# Patient Record
Sex: FEMALE | Race: BLACK OR AFRICAN AMERICAN | NOT HISPANIC OR LATINO | URBAN - METROPOLITAN AREA
[De-identification: names, ages, dates, MRNs, and addresses within clinical notes are randomized per-mention and may not be internally consistent; named-entity substitution may affect disease eponyms.]

---

## 2017-05-22 ENCOUNTER — EMERGENCY (EMERGENCY)
Facility: HOSPITAL | Age: 27
LOS: 1 days | Discharge: HOME | End: 2017-05-22
Attending: EMERGENCY MEDICINE | Admitting: EMERGENCY MEDICINE
Payer: COMMERCIAL

## 2017-05-22 VITALS
DIASTOLIC BLOOD PRESSURE: 78 MMHG | TEMPERATURE: 98 F | RESPIRATION RATE: 18 BRPM | OXYGEN SATURATION: 98 % | SYSTOLIC BLOOD PRESSURE: 125 MMHG | HEART RATE: 70 BPM

## 2017-05-22 PROCEDURE — 99282 EMERGENCY DEPT VISIT SF MDM: CPT

## 2017-05-22 RX ORDER — METOCLOPRAMIDE HCL 10 MG
10 TABLET ORAL ONCE
Qty: 0 | Refills: 0 | Status: COMPLETED | OUTPATIENT
Start: 2017-05-22 | End: 2017-05-22

## 2017-05-22 NOTE — ED PROVIDER NOTE - PROGRESS NOTE DETAILS
pt refusing reglan, understands minimal need for ct. possible need for mri outpt via pmd. pt with daily headaches which improve throughout the day. pt stable.will f/u with pmd KENISHA Garcia:(QUID PA) pt declined Reglan ambulating without difficulty.  Discharge reviewed and discussed with patient.

## 2017-05-22 NOTE — ED PROVIDER NOTE - OBJECTIVE STATEMENT
27yo F with a PMHx of high cholesterol presents to the ED c/o severe on and off HA and R eye pain x 1 week. Pt denies hx or family hx of migraines and denies having HA to this extent before. Also endorses 2 days of a sensation that something is crawling on back of scalp but denies finding anything there. Endorses pain with swallowing for a week. Denies taking anything for pain. Denies photophobia, eye discharge.

## 2017-05-22 NOTE — ED PROVIDER NOTE - CARE PLAN
Principal Discharge DX:	Headache Principal Discharge DX:	Headache  Instructions for follow-up, activity and diet:	Follow up with your Doctor in 1-2 days.  Rest.  Drink plenty of fluids.  Take Tylenol 650mg orally every 6 hours as needed for pain.  Return to the ER for any persistent/worsening or new symptoms weakness, dizziness, nausea, vomiting, change in vision or any concerning symptoms.

## 2017-05-22 NOTE — ED PROVIDER NOTE - PLAN OF CARE
Follow up with your Doctor in 1-2 days.  Rest.  Drink plenty of fluids.  Take Tylenol 650mg orally every 6 hours as needed for pain.  Return to the ER for any persistent/worsening or new symptoms weakness, dizziness, nausea, vomiting, change in vision or any concerning symptoms.

## 2017-05-22 NOTE — ED PROVIDER NOTE - NS ED MD SCRIBE ATTENDING SCRIBE SECTIONS
REVIEW OF SYSTEMS/PHYSICAL EXAM/VITAL SIGNS( Pullset)/PAST MEDICAL/SURGICAL/SOCIAL HISTORY/DISPOSITION/HIV/HISTORY OF PRESENT ILLNESS

## 2017-05-22 NOTE — ED ADULT TRIAGE NOTE - CHIEF COMPLAINT QUOTE
p/t c/o of headaches and rt eye pain on and off for few days p/t c/o of sore throat as well no neuro deficits noted

## 2018-07-11 ENCOUNTER — APPOINTMENT (OUTPATIENT)
Dept: OBGYN | Facility: CLINIC | Age: 28
End: 2018-07-11

## 2019-03-08 ENCOUNTER — APPOINTMENT (OUTPATIENT)
Dept: PEDIATRIC ALLERGY IMMUNOLOGY | Facility: CLINIC | Age: 29
End: 2019-03-08
Payer: MEDICAID

## 2019-03-08 VITALS — HEART RATE: 82 BPM | DIASTOLIC BLOOD PRESSURE: 86 MMHG | SYSTOLIC BLOOD PRESSURE: 136 MMHG | WEIGHT: 212.39 LBS

## 2019-03-08 DIAGNOSIS — Z87.898 PERSONAL HISTORY OF OTHER SPECIFIED CONDITIONS: ICD-10-CM

## 2019-03-08 DIAGNOSIS — R63.5 ABNORMAL WEIGHT GAIN: ICD-10-CM

## 2019-03-08 DIAGNOSIS — J30.9 ALLERGIC RHINITIS, UNSPECIFIED: ICD-10-CM

## 2019-03-08 PROCEDURE — 95004 PERQ TESTS W/ALRGNC XTRCS: CPT

## 2019-03-08 PROCEDURE — 99204 OFFICE O/P NEW MOD 45 MIN: CPT | Mod: 25

## 2019-03-08 RX ORDER — CETIRIZINE HYDROCHLORIDE 10 MG/1
10 TABLET, COATED ORAL
Qty: 1 | Refills: 1 | Status: ACTIVE | COMMUNITY
Start: 2019-03-08 | End: 1900-01-01

## 2019-03-08 NOTE — CONSULT LETTER
[Dear  ___] : Dear  [unfilled], [Consult Letter:] : I had the pleasure of evaluating your patient, [unfilled]. [Please see my note below.] : Please see my note below. [Consult Closing:] : Thank you very much for allowing me to participate in the care of this patient.  If you have any questions, please do not hesitate to contact me. [Sincerely,] : Sincerely, [FreeTextEntry2] : Dr. DENY PORTILLO, [FreeTextEntry3] : Pina Dominguez MD\par Attending Physician, Division of Allergy and Immunology\par , Departments of Medicine and Pediatrics\par [employer] [facility]\par [facility] \par [facility]

## 2019-03-08 NOTE — PHYSICAL EXAM
[Alert] : alert [Well Nourished] : well nourished [Healthy Appearance] : healthy appearance [No Acute Distress] : no acute distress [Well Developed] : well developed [Normal Pupil & Iris Size/Symmetry] : normal pupil and iris size and symmetry [No Discharge] : no discharge [No Photophobia] : no photophobia [Sclera Not Icteric] : sclera not icteric [Normal TMs] : both tympanic membranes were normal [Normal Lips/Tongue] : the lips and tongue were normal [Normal Outer Ear/Nose] : the ears and nose were normal in appearance [Normal Tonsils] : normal tonsils [No Thrush] : no thrush [Normal Dentition] : normal dentition [No Oral Lesions or Ulcers] : no oral lesions or ulcers [Boggy Nasal Turbinates] : boggy and/or pale nasal turbinates [Pharyngeal erythema] : no pharyngeal erythema [Exudate] : no exudate [Posterior Pharyngeal Cobblestoning] : posterior pharyngeal cobblestoning [Clear Rhinorrhea] : no clear rhinorrhea was seen [No Neck Mass] : no neck mass was observed [No LAD] : no lymphadenopathy [Supple] : the neck was supple [Normal Rate and Effort] : normal respiratory rhythm and effort [No Crackles] : no crackles [No Retractions] : no retractions [Bilateral Audible Breath Sounds] : bilateral audible breath sounds [Wheezing] : no wheezing was heard [Normal Rate] : heart rate was normal  [Normal S1, S2] : normal S1 and S2 [No murmur] : no murmur [Regular Rhythm] : with a regular rhythm [Soft] : abdomen soft [Not Tender] : non-tender [Not Distended] : not distended [No Masses] : no abdominal mass palpated [Normal Cervical Lymph Nodes] : cervical [Skin Intact] : skin intact  [No Rash] : no rash [No Skin Lesions] : no skin lesions [Eczematous Patches] : no eczematous patches [Xerosis] : no xerosis [No Joint Swelling or Erythema] : no joint swelling or erythema [No clubbing] : no clubbing [No Edema] : no edema [No Cyanosis] : no cyanosis [Normal Mood] : mood was normal [Normal Affect] : affect was normal [Alert, Awake, Oriented as Age-Appropriate] : alert, awake, oriented as age appropriate

## 2019-03-08 NOTE — HISTORY OF PRESENT ILLNESS
[Asthma] : asthma [Eczematous rashes] : eczematous rashes [Venom Reactions] : venom reactions [Food Allergies] : food allergies [de-identified] : 28 year old female presents in initial consultation for chronic urticaria, h/o angioedema, chronic rhinitis/ itchy eyes:\par \par H/o hives/ chronic urticaria: \par The patient has reportedly been getting hives randomly, sometimes several times a week and sometimes has no hives for a few weeks. Denies associated symptoms of respiratory and gastrointestinal complaints. Denies any association with food or medication intake. Patient also reports h/o angioedema/puffiness of her eyelids several months ago. No association with heat/cold. No h/o joint pain or swelling. No h/o hair loss, diarrhea/constipation. Patient reports weight gain of 13 Ib within 2-3 months. \par Symptoms usually self resolve without intervention.\par \par Chronic rhinitis: \par The patient reports h/o occasional nasal congestion, rhinorrhea, sneezing and itching of the eyes. Symptoms are reportedly worse during spring and fall. Denies h/o snoring. The patient has tried Zyrtec as needed with relief of symptoms.

## 2019-03-08 NOTE — REVIEW OF SYSTEMS
[Eye Discharge] : no eye discharge [Eye Redness] : no redness [Eye Itching] : no itchy eyes [Dry Eyes] : no dryness ~T of the eyes [Puffy Eyelids] : no puffy ~T eyelids [Bloodshot Eyes] : no bloodshot ~T eyes [Redness Of Eyelid] : no redness of ~T eyelid [Swollen Eyelids] : ~T ~L swollen eyelids [Nosebleeds] : no epistaxis [Rhinorrhea] : rhinorrhea [Nasal Congestion] : nasal congestion [Snoring] : no snoring [Post Nasal Drip] : post nasal drip [Sneezing] : sneezing [Urticaria] : urticaria [Atopic Dermatitis] : no atopic dermatitis [Pruritis] : pruritis [Dry Skin] : no ~L dry skin [Swelling] : swelling [Nl] : Genitourinary [FreeTextEntry4] : see HPI [de-identified] : weight gain

## 2019-03-08 NOTE — SOCIAL HISTORY
[House] : [unfilled] lives in a house  [Bedroom] :  in bedroom [None] : none [de-identified] : daughter [FreeTextEntry2] : student [Cockroaches] : Patient states that there are no cockroaches in the home [Dust Mite Covers] : does not have dust mite covers [Feather Pillows] : does not have feather pillows [Feather Comforter] : does not have a feather comforter [Basement] : not in the basement [Living Area] : not in the living area [Smokers in Household] : there are no smokers in the home

## 2019-03-08 NOTE — IMPRESSION
[Allergy Testing Dust Mite] : dust mites [Allergy Testing Cockroach] : cockroach [Allergy Testing Dog] : dog [Allergy Testing Cat] : cat [Allergy Testing Trees] : trees [Allergy Testing Weeds] : weeds [Allergy Testing Grasses] : grasses [Allergy Testing Mixed Feathers] : feathers [] : molds [________] : [unfilled]

## 2019-03-13 DIAGNOSIS — Z87.39 PERSONAL HISTORY OF OTHER DISEASES OF THE MUSCULOSKELETAL SYSTEM AND CONNECTIVE TISSUE: ICD-10-CM

## 2019-03-13 DIAGNOSIS — R79.82 ELEVATED C-REACTIVE PROTEIN (CRP): ICD-10-CM

## 2019-03-13 DIAGNOSIS — R70.0 ELEVATED ERYTHROCYTE SEDIMENTATION RATE: ICD-10-CM

## 2019-03-13 DIAGNOSIS — L50.8 OTHER URTICARIA: ICD-10-CM

## 2019-03-13 DIAGNOSIS — D69.6 THROMBOCYTOPENIA, UNSPECIFIED: ICD-10-CM

## 2019-03-13 DIAGNOSIS — E55.9 VITAMIN D DEFICIENCY, UNSPECIFIED: ICD-10-CM

## 2019-03-13 DIAGNOSIS — D70.9 NEUTROPENIA, UNSPECIFIED: ICD-10-CM

## 2019-03-13 LAB
25(OH)D3 SERPL-MCNC: 18.9 NG/ML
ALBUMIN SERPL ELPH-MCNC: 4.8 G/DL
ALP BLD-CCNC: 59 U/L
ALT SERPL-CCNC: 13 U/L
ANA SER IF-ACNC: NEGATIVE
ANION GAP SERPL CALC-SCNC: 11 MMOL/L
AST SERPL-CCNC: 18 U/L
BASOPHILS # BLD AUTO: 0.03 K/UL
BASOPHILS NFR BLD AUTO: 0.7 %
BILIRUB SERPL-MCNC: 0.6 MG/DL
BUN SERPL-MCNC: 8 MG/DL
C4 SERPL-MCNC: 22 MG/DL
CALCIUM SERPL-MCNC: 9.7 MG/DL
CHLORIDE SERPL-SCNC: 107 MMOL/L
CO2 SERPL-SCNC: 23 MMOL/L
CREAT SERPL-MCNC: 0.74 MG/DL
CRP SERPL-MCNC: 0.66 MG/DL
EOSINOPHIL # BLD AUTO: 0.18 K/UL
EOSINOPHIL NFR BLD AUTO: 4.4 %
ERYTHROCYTE [SEDIMENTATION RATE] IN BLOOD BY WESTERGREN METHOD: 39 MM/HR
GLUCOSE SERPL-MCNC: 84 MG/DL
HCT VFR BLD CALC: 35.3 %
HGB BLD-MCNC: 11.1 G/DL
IMM GRANULOCYTES NFR BLD AUTO: 0.2 %
LYMPHOCYTES # BLD AUTO: 2.12 K/UL
LYMPHOCYTES NFR BLD AUTO: 51.6 %
MAN DIFF?: NORMAL
MCHC RBC-ENTMCNC: 30.6 PG
MCHC RBC-ENTMCNC: 31.4 GM/DL
MCV RBC AUTO: 97.2 FL
MONOCYTES # BLD AUTO: 0.36 K/UL
MONOCYTES NFR BLD AUTO: 8.8 %
NEUTROPHILS # BLD AUTO: 1.41 K/UL
NEUTROPHILS NFR BLD AUTO: 34.3 %
PLATELET # BLD AUTO: 142 K/UL
POTASSIUM SERPL-SCNC: 4.2 MMOL/L
PROT SERPL-MCNC: 7.6 G/DL
RBC # BLD: 3.63 M/UL
RBC # FLD: 13.5 %
SODIUM SERPL-SCNC: 141 MMOL/L
THYROGLOB AB SERPL-ACNC: <20 IU/ML
THYROPEROXIDASE AB SERPL IA-ACNC: <10 IU/ML
TSH SERPL-ACNC: 0.7 UIU/ML
WBC # FLD AUTO: 4.11 K/UL

## 2019-03-13 RX ORDER — ERGOCALCIFEROL 1.25 MG/1
1.25 MG CAPSULE, LIQUID FILLED ORAL
Qty: 6 | Refills: 0 | Status: ACTIVE | COMMUNITY
Start: 2019-03-13 | End: 1900-01-01

## 2019-03-17 LAB — TOTAL IGE SMQN RAST: 139 KU/L

## 2019-03-25 ENCOUNTER — APPOINTMENT (OUTPATIENT)
Dept: OBGYN | Facility: CLINIC | Age: 29
End: 2019-03-25
Payer: MEDICAID

## 2019-03-25 VITALS
HEIGHT: 69 IN | SYSTOLIC BLOOD PRESSURE: 126 MMHG | HEART RATE: 71 BPM | DIASTOLIC BLOOD PRESSURE: 77 MMHG | WEIGHT: 211 LBS | BODY MASS INDEX: 31.25 KG/M2

## 2019-03-25 DIAGNOSIS — N76.2 ACUTE VULVITIS: ICD-10-CM

## 2019-03-25 DIAGNOSIS — N92.0 EXCESSIVE AND FREQUENT MENSTRUATION WITH REGULAR CYCLE: ICD-10-CM

## 2019-03-25 DIAGNOSIS — Z83.3 FAMILY HISTORY OF DIABETES MELLITUS: ICD-10-CM

## 2019-03-25 DIAGNOSIS — Z01.419 ENCOUNTER FOR GYNECOLOGICAL EXAMINATION (GENERAL) (ROUTINE) W/OUT ABNORMAL FINDINGS: ICD-10-CM

## 2019-03-25 LAB — CHRONIC URTICARIA PANEL (CU INDEX): <6

## 2019-03-25 PROCEDURE — 99385 PREV VISIT NEW AGE 18-39: CPT

## 2019-03-25 PROCEDURE — 99213 OFFICE O/P EST LOW 20 MIN: CPT | Mod: 25

## 2019-03-25 NOTE — PHYSICAL EXAM
[Awake] : awake [Alert] : alert [Soft] : soft [Oriented x3] : oriented to person, place, and time [Normal] : uterus [Discharge] : a  ~M vaginal discharge was present [No Bleeding] : there was no active vaginal bleeding [Uterine Adnexae] : were not tender and not enlarged [Acute Distress] : no acute distress [Mass] : no breast mass [Nipple Discharge] : no nipple discharge [Axillary LAD] : no axillary lymphadenopathy [Tender] : non tender [FreeTextEntry4] : white dc

## 2019-04-02 LAB
C TRACH RRNA SPEC QL NAA+PROBE: NOT DETECTED
CANDIDA VAG CYTO: NOT DETECTED
CYTOLOGY CVX/VAG DOC THIN PREP: NORMAL
G VAGINALIS+PREV SP MTYP VAG QL MICRO: NOT DETECTED
HPV HIGH+LOW RISK DNA PNL CVX: NOT DETECTED
N GONORRHOEA RRNA SPEC QL NAA+PROBE: NOT DETECTED
SOURCE AMPLIFICATION: NORMAL
T VAGINALIS VAG QL WET PREP: NOT DETECTED

## 2019-04-24 ENCOUNTER — OTHER (OUTPATIENT)
Age: 29
End: 2019-04-24

## 2019-04-25 ENCOUNTER — APPOINTMENT (OUTPATIENT)
Dept: OBGYN | Facility: CLINIC | Age: 29
End: 2019-04-25
Payer: MEDICAID

## 2019-04-25 ENCOUNTER — ASOB RESULT (OUTPATIENT)
Age: 29
End: 2019-04-25

## 2019-04-25 VITALS
HEIGHT: 69 IN | BODY MASS INDEX: 30.66 KG/M2 | DIASTOLIC BLOOD PRESSURE: 81 MMHG | SYSTOLIC BLOOD PRESSURE: 134 MMHG | WEIGHT: 207 LBS | HEART RATE: 69 BPM

## 2019-04-25 PROCEDURE — 99213 OFFICE O/P EST LOW 20 MIN: CPT

## 2019-04-25 PROCEDURE — 76830 TRANSVAGINAL US NON-OB: CPT

## 2019-04-25 NOTE — HISTORY OF PRESENT ILLNESS
[Acute] : an acute [Itching] : itching [Discharge] : discharge [] : the vaginal discharge is described as [White] : white [Vagina] : vagina

## 2019-04-25 NOTE — CHIEF COMPLAINT
[Follow Up] : follow up GYN visit [FreeTextEntry1] : follow up after TVUS, to discuss difficulty getting pregnant, and vaginal itching/discomfort

## 2019-04-26 LAB
ESTRADIOL SERPL-MCNC: 92 PG/ML
FSH SERPL-MCNC: 3.2 IU/L
LH SERPL-ACNC: 6.5 IU/L
PROLACTIN SERPL-MCNC: 9.5 NG/ML
T3FREE SERPL-MCNC: 2.39 PG/ML
T4 FREE SERPL-MCNC: 1 NG/DL
TSH SERPL-ACNC: 1.21 UIU/ML

## 2019-04-29 LAB — ESTROGEN SERPL-MCNC: 303 PG/ML

## 2019-05-01 LAB
TESTOST BND SERPL-MCNC: <0.2 PG/ML
TESTOST SERPL-MCNC: 17 NG/DL

## 2019-05-02 LAB
ANTI-MUELLERIAN HORMONE: 4.16 NG/ML
DHEA-SULFATE, SERUM: 61 UG/DL

## 2019-06-14 ENCOUNTER — OTHER (OUTPATIENT)
Age: 29
End: 2019-06-14

## 2021-11-04 ENCOUNTER — NON-APPOINTMENT (OUTPATIENT)
Age: 31
End: 2021-11-04

## 2021-11-04 ENCOUNTER — APPOINTMENT (OUTPATIENT)
Dept: GYNECOLOGIC ONCOLOGY | Facility: CLINIC | Age: 31
End: 2021-11-04
Payer: MEDICAID

## 2021-11-04 VITALS
DIASTOLIC BLOOD PRESSURE: 72 MMHG | HEART RATE: 60 BPM | SYSTOLIC BLOOD PRESSURE: 118 MMHG | BODY MASS INDEX: 29.18 KG/M2 | WEIGHT: 197 LBS | HEIGHT: 69 IN

## 2021-11-04 DIAGNOSIS — Z86.39 PERSONAL HISTORY OF OTHER ENDOCRINE, NUTRITIONAL AND METABOLIC DISEASE: ICD-10-CM

## 2021-11-04 DIAGNOSIS — Z78.9 OTHER SPECIFIED HEALTH STATUS: ICD-10-CM

## 2021-11-04 DIAGNOSIS — R97.1 ELEVATED CANCER ANTIGEN 125 [CA 125]: ICD-10-CM

## 2021-11-04 DIAGNOSIS — N83.209 UNSPECIFIED OVARIAN CYST, UNSPECIFIED SIDE: ICD-10-CM

## 2021-11-04 DIAGNOSIS — Z76.89 PERSONS ENCOUNTERING HEALTH SERVICES IN OTHER SPECIFIED CIRCUMSTANCES: ICD-10-CM

## 2021-11-04 DIAGNOSIS — Z80.1 FAMILY HISTORY OF MALIGNANT NEOPLASM OF TRACHEA, BRONCHUS AND LUNG: ICD-10-CM

## 2021-11-04 PROCEDURE — 99203 OFFICE O/P NEW LOW 30 MIN: CPT

## 2021-11-04 RX ORDER — TERCONAZOLE 4 MG/G
0.4 CREAM VAGINAL DAILY
Qty: 1 | Refills: 0 | Status: DISCONTINUED | COMMUNITY
Start: 2019-04-25 | End: 2021-11-04

## 2021-11-04 RX ORDER — FLUTICASONE PROPIONATE 50 UG/1
50 SPRAY, METERED NASAL DAILY
Qty: 1 | Refills: 1 | Status: DISCONTINUED | COMMUNITY
Start: 2019-03-08 | End: 2021-11-04

## 2021-11-08 LAB — CANCER AG125 SERPL-ACNC: 36 U/ML

## 2021-12-18 ENCOUNTER — APPOINTMENT (OUTPATIENT)
Dept: MRI IMAGING | Facility: CLINIC | Age: 31
End: 2021-12-18

## 2021-12-24 ENCOUNTER — APPOINTMENT (OUTPATIENT)
Dept: MRI IMAGING | Facility: CLINIC | Age: 31
End: 2021-12-24
Payer: MEDICAID

## 2021-12-24 ENCOUNTER — RESULT REVIEW (OUTPATIENT)
Age: 31
End: 2021-12-24

## 2021-12-24 ENCOUNTER — OUTPATIENT (OUTPATIENT)
Dept: OUTPATIENT SERVICES | Facility: HOSPITAL | Age: 31
LOS: 1 days | End: 2021-12-24
Payer: MEDICAID

## 2021-12-24 DIAGNOSIS — R97.1 ELEVATED CANCER ANTIGEN 125 [CA 125]: ICD-10-CM

## 2021-12-24 DIAGNOSIS — N83.209 UNSPECIFIED OVARIAN CYST, UNSPECIFIED SIDE: ICD-10-CM

## 2021-12-24 PROCEDURE — A9585: CPT

## 2021-12-24 PROCEDURE — 72197 MRI PELVIS W/O & W/DYE: CPT | Mod: 26

## 2021-12-24 PROCEDURE — 72197 MRI PELVIS W/O & W/DYE: CPT

## 2022-07-06 NOTE — PHYSICAL EXAM
[Normal] : Anus and perineum: Normal sphincter tone, no masses, no prolapse. [Fully active, able to carry on all pre-disease performance without restriction] : Status 0 - Fully active, able to carry on all pre-disease performance without restriction [Chaperone Present] : A chaperone was present in the examining room during all aspects of the physical examination [FreeTextEntry1] : Lea [de-identified] : pfamagtiel scar [de-identified] : mobile [de-identified] : b/l adnexa nonpalpable on bimanual exam.

## 2022-07-06 NOTE — REVIEW OF SYSTEMS
[Negative] : Musculoskeletal [Abn Vag Bleeding] : abnormal vaginal bleeding [FreeTextEntry4] : occasional pelvic pain

## 2022-07-06 NOTE — LETTER BODY
[I had the pleasure of evaluating your patient, [unfilled] for ___] : I had the pleasure of evaluating your patient, [unfilled] for [unfilled]. [Attached please find my note.] : Attached please find my note. [Dear  ___] : Dear  [unfilled], [FreeTextEntry2] : Additional workup was ordered. Repeat  was within normal range at 36.\par Pelvic MRI was normal without evidence of cyst, endometriosis or other etiology of pelvic pain. She will return to you for ongoing GYN care and may return to our office as needed in the future. \par Thank you very much for referring this karen patient. [FreeTextEntry1] : MRI,

## 2022-07-06 NOTE — OB HISTORY
[Total Preg ___] : : [unfilled] [Living ___] : [unfilled] (living) [ ___] : [unfilled]  section delivery(s) [Definite ___ (Date)] : the last menstrual period was [unfilled] [Regular Cycle Intervals] : periods have been regular [Menarche Age ____] : age at menarche was [unfilled] [Normal Amount/Duration] : was abnormal

## 2022-07-06 NOTE — HISTORY OF PRESENT ILLNESS
[FreeTextEntry1] : Ms. Smith, 30 years old, is referred by Dr. Malina Remy for an elevated  level, possible ovarian cyst.  \par She has a history of ovarian cysts.  In March 2021, office sonogram (per MD note) identified a 4cm ovarian cyst (possible endometrioma).  Subsequently, patient had  level drawn which was elevated.  Follow up  completed at a different lab was slightly more elevated.   A follow up TVUS at [facility] did not reveal any ovarian cysts.  \par \par She is a single nonsmoker. She has a daughter. SHe is a student.\par \par Denies f/c/n/v/d/changes to bowel or bladder habits.  Denies unintentional weight loss or gain. Denies abdominal bloating or distension.  Does report occasional pelvic pain that is transient and not associated with menstrual cycle.  She does report since having her daughter in 2016, her periods come monthly but last about 2 weeks (1 week full period and 1 week of spotting).  She denies having an EMB.  She denies any other associated signs or symptoms.  \par   \par Labs:\par 9/24/21  = 147.1 ([facility])\par 6/12/21  = 130 ([facility])\par \par Imaging:\par 10/1/2021 TVUS ([facility]) - H/O left ovarian cyst\par Uterus:  9.3 x 6.4 x 9.1 cm. \par Endometrium:  4mm \par Right Ovary:  4.4 x 1.9 x 2.3 cm.  No masses or cysts present.\par Left Ovary:  3.7 x 1.7 x 2.1 cm.  No masses or cysts present.\par Free Fluid:  trace\par \par 4/25/2019 TVUS ([facility]) - Menorrhagia\par Uterus:  7.02 x 6.42 x 5.28cm\par endometrium:  15.39 mm\par Right Ovary:  2.67 x 2.48 x 2.78 cm - small follicles\par Left Ovary:  3.39 x 1.83 x 1.99 cm - small follicles\par \par 6/26/2018 TVUS (Office) Indication:  Irregular periods\par Uterus:  7.04 x 5.53 x 5.41 cm\par Endometrium:  12.6 mm, possible endometrial polyp 0.67 x 0.68 cm. \par Right Ovary:  3.16 x 3.35 x 1.86 cm.  right adnexal free fluid noted:  1.59 x 5.05 x 3.42 cm.\par Left Ovary:  2.36 x 1.76 x 1.59 cm.  \par Free Fluid:  none\par Impression: Possible endometrial polyp 0.67 x 0.68 cm.  Right adnexal free fluid noted 1.59 x 5.05 x 3.42 cm.\par \par Health Maintenance:\par Gardasil Vaccine received:  Denies\par COVID vaccine received:   Denies\par Mammogram:  Never\par Colonoscopy: Never\par PAP: Early 2021 - normal per patient.  Denies history of abnormal paps in the past. \par \par GYN/Ref:  Dr. Malina Remy\par PCP:  Dr. Janeen Kovacs